# Patient Record
Sex: FEMALE | Race: WHITE | NOT HISPANIC OR LATINO | Employment: PART TIME | URBAN - METROPOLITAN AREA
[De-identification: names, ages, dates, MRNs, and addresses within clinical notes are randomized per-mention and may not be internally consistent; named-entity substitution may affect disease eponyms.]

---
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
## 2021-01-27 ENCOUNTER — TELEPHONE (OUTPATIENT)
Dept: ENDOCRINOLOGY | Facility: CLINIC | Age: 21
End: 2021-01-27

## 2021-02-16 ENCOUNTER — TELEPHONE (OUTPATIENT)
Dept: ENDOCRINOLOGY | Facility: CLINIC | Age: 21
End: 2021-02-16

## 2021-02-16 NOTE — TELEPHONE ENCOUNTER
[employer] PA started for [place], [phone]  Submitted via [employer]

## 2021-02-18 NOTE — TELEPHONE ENCOUNTER
[facility] Simon DIALLO denied  Reason for denial:  Unable to establish medical necessity  Requesting patient try additional formulary alternatives --    Apidra or Insulin Aspart    Please review

## 2021-02-19 NOTE — TELEPHONE ENCOUNTER
[facility] approved, Eff 2/19/2021 - 2/19/2022, Quantity # 15 mL per 25 days    Faxed PA approval notice to [facility] @ [phone]

## 2021-04-29 ENCOUNTER — TELEPHONE (OUTPATIENT)
Dept: ENDOCRINOLOGY | Facility: CLINIC | Age: 21
End: 2021-04-29

## 2021-05-10 ENCOUNTER — TELEPHONE (OUTPATIENT)
Dept: PEDIATRICS CLINIC | Facility: CLINIC | Age: 21
End: 2021-05-10

## 2021-06-23 ENCOUNTER — TELEPHONE (OUTPATIENT)
Dept: ENDOCRINOLOGY | Facility: CLINIC | Age: 21
End: 2021-06-23

## 2021-06-23 NOTE — TELEPHONE ENCOUNTER
[facility] faxed request for chart notes within the last 6 months  Pt was last seen on 12/21/20  Her next appt is scheduled for 7/7/21  Sent fax back to them indicating this  Faxed to [facility] @ [phone]

## 2021-06-28 NOTE — TELEPHONE ENCOUNTER
06/28/21 1:52 PM     Thank you for your request  Your request has been received, reviewed, and the patient chart updated  The PCP has successfully been removed with a patient attribution note  This message will now be completed      Thank you  Idris [place]

## 2021-07-07 ENCOUNTER — OFFICE VISIT (OUTPATIENT)
Dept: PEDIATRIC ENDOCRINOLOGY CLINIC | Facility: CLINIC | Age: 21
End: 2021-07-07
Payer: COMMERCIAL

## 2021-07-07 VITALS
WEIGHT: 123 LBS | DIASTOLIC BLOOD PRESSURE: 60 MMHG | HEIGHT: 61 IN | HEART RATE: 62 BPM | BODY MASS INDEX: 23.22 KG/M2 | SYSTOLIC BLOOD PRESSURE: 110 MMHG

## 2021-07-07 DIAGNOSIS — E10.65 UNCONTROLLED TYPE 1 DIABETES MELLITUS WITH HYPERGLYCEMIA, WITH LONG-TERM CURRENT USE OF INSULIN (HCC): Primary | ICD-10-CM

## 2021-07-07 DIAGNOSIS — E10.9 TYPE 1 DIABETES MELLITUS WITHOUT COMPLICATION (HCC): ICD-10-CM

## 2021-07-07 LAB — SL AMB POCT HEMOGLOBIN AIC: 6.2 (ref ?–6.5)

## 2021-07-07 PROCEDURE — 83036 HEMOGLOBIN GLYCOSYLATED A1C: CPT | Performed by: NURSE PRACTITIONER

## 2021-07-07 PROCEDURE — 99214 OFFICE O/P EST MOD 30 MIN: CPT | Performed by: NURSE PRACTITIONER

## 2021-07-07 PROCEDURE — 95251 CONT GLUC MNTR ANALYSIS I&R: CPT | Performed by: NURSE PRACTITIONER

## 2021-07-07 RX ORDER — BLOOD SUGAR DIAGNOSTIC
STRIP MISCELLANEOUS
Qty: 600 EACH | Refills: 3 | Status: SHIPPED | OUTPATIENT
Start: 2021-07-07

## 2021-07-07 RX ORDER — BLOOD-GLUCOSE SENSOR
EACH MISCELLANEOUS
Qty: 9 EACH | Refills: 3 | Status: SHIPPED | OUTPATIENT
Start: 2021-07-07 | End: 2022-02-18 | Stop reason: SDUPTHER

## 2021-07-07 RX ORDER — PEN NEEDLE, DIABETIC 32GX 5/32"
NEEDLE, DISPOSABLE MISCELLANEOUS
Qty: 900 EACH | Refills: 3 | Status: SHIPPED | OUTPATIENT
Start: 2021-07-07

## 2021-07-07 RX ORDER — INSULIN DEGLUDEC INJECTION 100 U/ML
INJECTION, SOLUTION SUBCUTANEOUS
Qty: 45 ML | Refills: 3 | Status: SHIPPED | OUTPATIENT
Start: 2021-07-07 | End: 2022-01-24

## 2021-07-07 RX ORDER — BLOOD-GLUCOSE TRANSMITTER
EACH MISCELLANEOUS
Qty: 1 EACH | Refills: 3 | Status: SHIPPED | OUTPATIENT
Start: 2021-07-07 | End: 2022-02-18 | Stop reason: SDUPTHER

## 2021-07-07 RX ORDER — INSULIN ASPART INJECTION 100 [IU]/ML
INJECTION, SOLUTION SUBCUTANEOUS
Qty: 60 ML | Refills: 3 | Status: SHIPPED | OUTPATIENT
Start: 2021-07-07

## 2021-07-07 RX ORDER — LANCETS
EACH MISCELLANEOUS
Qty: 600 EACH | Refills: 3 | Status: SHIPPED | OUTPATIENT
Start: 2021-07-07

## 2021-07-07 NOTE — PROGRESS NOTES
History of Present Illness     Chief Complaint: follow up    HPI:  Jassi Erzao is a 22 y o  female who comes in for follow up of type 1 diabetes mellitus  History was obtained from the patient and a review of the records  As you know, Charlette Mack was diagnosed at age 10 years, and transferred care to our office in July 2017  Charlette Mack was on NPH regimen until transitioning to a basal-bolus regimen until Jan 2019  She has also been on a Dexcom G6 since Nov 2018, and has been doing very well with this        Since the previous visit, Charlette Mack has been doing very well  She is using [place], which she likes better than her previous insulins, and is wearing her CGM all of the time and making adjustments during the day -- downloads today in the office continue to show marked improvement in glycemic control   Charlette Mack has been struggling with overall higher numbers than normal  See scanned documents for full details        CURRENT INSULIN REGIMEN:  Tresiba -- 26 units every night  Fiasp -- 1 unit per 7 grams of CHO  Fiasp -- 1 unit per 50 mg/dL       Patient Active Problem List   Diagnosis    Uncontrolled type 1 diabetes mellitus with hyperglycemia, with long-term current use of insulin ([address] )    Spinal curvature    Vitamin D deficiency     Past Medical History:  Past Medical History:   Diagnosis Date    Allergic rhinitis     Type 1 diabetes mellitus ([facility] ) 2009     Past Surgical History:   Procedure Laterality Date    WISDOM TOOTH EXTRACTION       Medications:  Current Outpatient Medications   Medication Sig Dispense Refill    Accu-Chek FastClix Lancets MISC Test BG up to 10x daily as directed 600 each 3    Accu-Chek Guide test strip Check Bloods 6 times daily 600 each 3    Blood Glucose Calibration (Accu-Chek Guide Control) LIQD Use as needed to calibrate new bottle of test strips      Blood Glucose Monitoring Suppl (Accu-Chek Guide) w/Device KIT Test BG up to 10x daily as directed      Continuous Blood Gluc  (Dexcom G6 ) NELSON Use daily as directed for CGM      Continuous Blood Gluc Sensor (Dexcom G6 Sensor) MISC Use daily as directed for CGM - Change every 10 days 9 each 3    Continuous Blood Gluc Transmit (Dexcom G6 Transmitter) MISC Use daily as directed for CGM - Change every 3 months 1 each 3    Glucagon (Baqsimi Two Pack) 3 MG/DOSE POWD Use for hypoglycemic emergency as directed  1 each 1    insulin aspart, w/niacinamide, (Fiasp FlexTouch) 100 Units/mL injection pen Use up to 60 units daily as per insulin scales  60 mL 3    insulin degludec Ghanshyam Furth FlexTouch) 100 units/mL injection pen Use up to 50 units daily as per physician instructions 45 mL 3    Insulin Pen Needle (BD Pen Needle Aide U/F) 32G X 4 MM MISC Inject insulin up to 10 times daily 900 each 3    norethindrone-ethinyl estradiol-ferrous fumarate (APRIL 24 FE) 1-20 MG-MCG(24) per tablet TAKE 1 TABLET BY MOUTH EVERY DAY       No current facility-administered medications for this visit  Allergies: Allergies   Allergen Reactions    Pollen Extract Sneezing and Nasal Congestion       Family History:  Family History   Problem Relation Age of Onset    No Known Problems Mother     No Known Problems Father     Rheum arthritis Maternal Grandmother      Social History  Living Conditions    Lives with dad        Review of Systems   Constitutional: Negative for chills and fever  HENT: Negative for ear pain and sore throat  Eyes: Negative for pain and visual disturbance  Respiratory: Negative for cough and shortness of breath  Cardiovascular: Negative for chest pain and palpitations  Gastrointestinal: Negative for abdominal pain and vomiting  Genitourinary: Negative for dysuria and menstrual problem  Musculoskeletal: Negative for arthralgias and back pain  Skin: Negative for color change and rash  Neurological: Negative for seizures and syncope     All other systems reviewed and are negative        Objective   Vitals: Blood pressure 110/60, pulse 62, height 5' 1" (1 549 m), weight 55 8 kg (123 lb)  , Body mass index is 23 24 kg/m² ,    Facility age limit for growth percentiles is 20 years  Facility age limit for growth percentiles is 20 years  Physical Exam  Constitutional: She is oriented to person, place, and time  She appears well-developed and well-nourished  No distress  HENT:   Head: Normocephalic and atraumatic  Neck: Normal range of motion  Pulmonary/Chest: Effort normal    Musculoskeletal: Normal range of motion  Neurological: She is alert and oriented to person, place, and time  Skin: She is not diaphoretic  Psychiatric: She has a normal mood and affect  Her behavior is normal      Lab Results: I have personally reviewed pertinent lab results  Hemoglobin A1c Aug 2016 -- 8 2%  Hemoglobin A1c Nov 2017 -- 9 2%  Hemoglobin A1c Feb 2018 -- 7%  Hemoglobin A1c June 2018 -- 6 8%  Hemoglobin A1c Nov 2018 -- 7 1%  Hemoglobin A1c June 2019 -- 7 1%  Hemoglobin A1c  March 2020 -- 6 8%  Hemoglobin A1c July 2020-- 6 0%  Hemoglobin A1c  Dec 2020-- 5 9%  Hemoglobin A1c July 2021-- 6 2%    For yearly screening labs for December 2020, see chart  Assessment/Plan     Assessment and Plan:  22 y o  female with the following issues:  Problem List Items Addressed This Visit        Endocrine    Uncontrolled type 1 diabetes mellitus with hyperglycemia, with long-term current use of insulin ([facility] ) - Primary     Declan Augustine continues to have excellent control of her diabetes; however, she is struggling with blood sugars running slightly higher than normal:   1  Continue Tresiba to 28 units at night time   2  Fiasp doses of 1 unit per 7 grams of carbs and 1 unit per 50 points  3  A1c today is 6 2%, with very steady sugars overall  4  Yearly screening labs were completed December 2020  5   Follow up in three months, but I understand it will be based on college schedule         Relevant Medications    Insulin Pen Needle (BD Pen Needle Aide U/F) 32G X 4 MM MISC    insulin degludec Malachi Ramiro FlexTouch) 100 units/mL injection pen    insulin aspart, w/niacinamide, (Fiasp FlexTouch) 100 Units/mL injection pen    Continuous Blood Gluc Transmit (Dexcom G6 Transmitter) MISC    Continuous Blood Gluc Sensor (Dexcom G6 Sensor) MISC    Accu-Chek FastClix Lancets MISC    Other Relevant Orders    POCT hemoglobin A1c (Completed)      Other Visit Diagnoses     Type 1 diabetes mellitus without complication (HCC)        Relevant Medications    insulin degludec Malachi Ramiro FlexTouch) 100 units/mL injection pen    insulin aspart, w/niacinamide, (Fiasp FlexTouch) 100 Units/mL injection pen    Accu-Chek Guide test strip

## 2021-07-07 NOTE — PATIENT INSTRUCTIONS
1  Continue Tresiba to 28 units at night time   2  Fiasp doses of 1 unit per 7 grams of carbs and 1 unit per 50 points  3  A1c today is 6 2%, with very steady sugars overall  4  Yearly screening labs were completed December 2020  5   Follow up in three months, but I understand it will be based on college schedule

## 2021-07-08 NOTE — ASSESSMENT & PLAN NOTE
Declan Augustine continues to have excellent control of her diabetes; however, she is struggling with blood sugars running slightly higher than normal:   1  Continue Tresiba to 28 units at night time   2  Fiasp doses of 1 unit per 7 grams of carbs and 1 unit per 50 points  3  A1c today is 6 2%, with very steady sugars overall  4  Yearly screening labs were completed December 2020  5   Follow up in three months, but I understand it will be based on college schedule

## 2021-07-12 NOTE — TELEPHONE ENCOUNTER
Mom l/m requesting a call back  She indicated that [address] is still waiting on chart notes from our office in order to refill supplies  Sent email to [facility] rep to expedite order request  Called mom back and l/kenny notifying her chart notes were sent and email was also sent to rep  Asked to call back if any questions

## 2021-07-13 NOTE — TELEPHONE ENCOUNTER
[facility] wrote back to me and ensured me they had all the documents needed to process supply order  She has forwarded info to the documentation team to process the order ASAP  Called mom back and notified her  She will call me back if any further issues

## 2021-07-30 ENCOUNTER — TELEPHONE (OUTPATIENT)
Dept: ENDOCRINOLOGY | Facility: CLINIC | Age: 21
End: 2021-07-30

## 2021-11-22 ENCOUNTER — TELEMEDICINE (OUTPATIENT)
Dept: PEDIATRIC ENDOCRINOLOGY CLINIC | Facility: CLINIC | Age: 21
End: 2021-11-22
Payer: COMMERCIAL

## 2021-11-22 DIAGNOSIS — E55.9 VITAMIN D DEFICIENCY: ICD-10-CM

## 2021-11-22 DIAGNOSIS — E10.65 UNCONTROLLED TYPE 1 DIABETES MELLITUS WITH HYPERGLYCEMIA, WITH LONG-TERM CURRENT USE OF INSULIN (HCC): Primary | ICD-10-CM

## 2021-11-22 PROCEDURE — 95251 CONT GLUC MNTR ANALYSIS I&R: CPT | Performed by: NURSE PRACTITIONER

## 2021-11-22 PROCEDURE — 99214 OFFICE O/P EST MOD 30 MIN: CPT | Performed by: NURSE PRACTITIONER

## 2021-12-02 NOTE — PROGRESS NOTES
Patient accompanied by father, here for removal of professional CGM  Pt states she found the informaltion from the sensor was helpful, but she didn't like wearing the device  She might be interested in a smaller sensor;  I gave her information about the [facility] and encouraged her to discuss with Dr Olga Crandall at her next visit  Sensor removal by educator; site [id number] downloaded via [employer]  Reports printed and submitted to Dr Olga Crandall for review  Device cleared;  Equipment sanitized  PAST SURGICAL HISTORY:  No significant past surgical history

## 2021-12-28 ENCOUNTER — TELEPHONE (OUTPATIENT)
Dept: PEDIATRIC ENDOCRINOLOGY CLINIC | Facility: CLINIC | Age: 21
End: 2021-12-28

## 2022-01-23 DIAGNOSIS — E10.65 UNCONTROLLED TYPE 1 DIABETES MELLITUS WITH HYPERGLYCEMIA, WITH LONG-TERM CURRENT USE OF INSULIN (HCC): ICD-10-CM

## 2022-01-24 RX ORDER — INSULIN DEGLUDEC INJECTION 100 U/ML
INJECTION, SOLUTION SUBCUTANEOUS
Qty: 15 ML | Refills: 2 | Status: SHIPPED | OUTPATIENT
Start: 2022-01-24 | End: 2022-04-27

## 2022-01-25 ENCOUNTER — TELEPHONE (OUTPATIENT)
Dept: PEDIATRIC ENDOCRINOLOGY CLINIC | Facility: CLINIC | Age: 22
End: 2022-01-25

## 2022-01-26 NOTE — TELEPHONE ENCOUNTER
PA approved, Eff 1/25/2022 - 1/25/2023, Quantity # 15 per 30 days    Approval faxed to [facility] @ [phone]

## 2022-02-08 ENCOUNTER — TELEPHONE (OUTPATIENT)
Dept: PEDIATRIC ENDOCRINOLOGY CLINIC | Facility: CLINIC | Age: 22
End: 2022-02-08

## 2022-02-10 NOTE — TELEPHONE ENCOUNTER
PA approved, Eff 2/8/2022 - 2/8/2023, Quantity # 15 per 25 days    Approval faxed to [facility] @ [phone]

## 2022-02-11 ENCOUNTER — TELEPHONE (OUTPATIENT)
Dept: PEDIATRIC ENDOCRINOLOGY CLINIC | Facility: CLINIC | Age: 22
End: 2022-02-11

## 2022-02-18 DIAGNOSIS — E10.65 UNCONTROLLED TYPE 1 DIABETES MELLITUS WITH HYPERGLYCEMIA, WITH LONG-TERM CURRENT USE OF INSULIN (HCC): ICD-10-CM

## 2022-02-18 RX ORDER — BLOOD-GLUCOSE TRANSMITTER
EACH MISCELLANEOUS
Qty: 1 EACH | Refills: 3 | Status: SHIPPED | OUTPATIENT
Start: 2022-02-18

## 2022-02-18 RX ORDER — BLOOD-GLUCOSE SENSOR
EACH MISCELLANEOUS
Qty: 3 EACH | Refills: 11 | Status: SHIPPED | OUTPATIENT
Start: 2022-02-18

## 2022-02-18 NOTE — TELEPHONE ENCOUNTER
Mom is extremely upset with [facility] and trying to get Dexcom supplies  She is continuously having issues  Notified mom that Rx can be filled at local pharmacy, but a PA will still need to be submitted  Mom is asking that Rx be sent to local pharmacy  Please authorize

## 2022-02-18 NOTE — TELEPHONE ENCOUNTER
Called [facility] to see if PA was needed  Pharmacy tech said it went through with $0 copay  Called mom back and notified her  She was very happy  Sent email to [facility] rep to cancel order

## 2022-03-07 ENCOUNTER — TELEMEDICINE (OUTPATIENT)
Dept: PEDIATRIC ENDOCRINOLOGY CLINIC | Facility: CLINIC | Age: 22
End: 2022-03-07
Payer: COMMERCIAL

## 2022-03-07 DIAGNOSIS — E10.65 UNCONTROLLED TYPE 1 DIABETES MELLITUS WITH HYPERGLYCEMIA, WITH LONG-TERM CURRENT USE OF INSULIN (HCC): ICD-10-CM

## 2022-03-07 PROCEDURE — 95251 CONT GLUC MNTR ANALYSIS I&R: CPT | Performed by: NURSE PRACTITIONER

## 2022-03-07 PROCEDURE — 99213 OFFICE O/P EST LOW 20 MIN: CPT | Performed by: NURSE PRACTITIONER

## 2022-03-07 RX ORDER — DROSPIRENONE AND ETHINYL ESTRADIOL 0.02-3(28)
1 KIT ORAL DAILY
COMMUNITY
Start: 2022-03-03

## 2022-03-07 NOTE — PATIENT INSTRUCTIONS
1) No changes to insulin regimen at this time; however, if you notice blood sugars are still running higher while home with accurate carbohydrate count, you can always send in blood sugars for review  2) Will follow up on lipid panel   3) Follow up in 3 months but discussed transition to adult care as David Crane will be moving back home after graduation in Spring

## 2022-03-07 NOTE — ASSESSMENT & PLAN NOTE
Felton Mccollum continues to demonstrate excellent control in her diabetes management, her hemoglobin A1c was 6 0% without significant lows and an average daily glucose of 142 mg/dL  No changes to Haven's current insulin regimen for now  Will transition to adult care after next follow up  Will follow up lipid panel, discussed lifestyle modifications and Felton Mccollum is not aware of family hx of hyperlipidemia

## 2022-03-07 NOTE — PROGRESS NOTES
Virtual Regular Visit    Verification of patient location:    Patient is located in the following state in which I hold an active license [place]      Assessment/Plan:    Problem List Items Addressed This Visit        Endocrine    Uncontrolled type 1 diabetes mellitus with hyperglycemia, with long-term current use of insulin ([address] )     Payal Hartman continues to demonstrate excellent control in her diabetes management, her hemoglobin A1c was 6 0% without significant lows and an average daily glucose of 142 mg/dL  No changes to Haven's current insulin regimen for now  Will transition to adult care after next follow up  Will follow up lipid panel, discussed lifestyle modifications and Payal Hartman is not aware of family hx of hyperlipidemia  Reason for visit is   Chief Complaint   Patient presents with    Virtual Regular Visit        Encounter provider BETTY Grover    Provider located at [address] [place] [address] Rd  [address] [facility] [address]  [address] [place] [address]  [phone]      Recent Visits  No visits were found meeting these conditions  Showing recent visits within past 7 days and meeting all other requirements  Today's Visits  Date Type Provider Dept   03/07/22 Telemedicine Mily Oneil, [address] [place] [address] Pediatric Endocrinology [facility] 23   Showing today's visits and meeting all other requirements  Future Appointments  No visits were found meeting these conditions  Showing future appointments within next 150 days and meeting all other requirements       The patient was identified by name and date of birth  Anthony Sharma was informed that this is a telemedicine visit and that the visit is being conducted through [facility] and patient was informed this is a secure, HIPAA-complaint platform  She agrees to proceed     My office door was closed  No one else was in the room    She acknowledged consent and understanding of privacy and security of the video platform  The patient has agreed to participate and understands they can discontinue the visit at any time  Patient is aware this is a billable service  History of Present Illness     Chief Complaint: follow up    HPI:  Chad Preston is a 22 y o  female who follows up for management of type 1 diabetes mellitus  History was obtained from the patient and a review of the records  As you know, Amol Dahl was diagnosed at age 10 years, and transferred care to our office in July 2017  Haven was on NPH regimen until transitioning to a basal-bolus regimen until Jan 2019  She has also been on a Dexcom G6 since Nov 2018, and has been doing very well with this        Since the previous visit, Amol Dahl has been doing very well  She is using [place], which she likes better than her previous insulins, and is wearing her CGM all of the time  Amol Dahl had a concern with her dexcom transmitter, blood sugars were running higher and unintentionally overdosed insulin based on glucose reading  Albert Schaffer blood sugar in the 50s and appropriately responded, no glucagon or emergency medical care required  Upon review of Haven's dexcom download, she struggles with carbohydrate counts at school and often runs higher at these meals  Average glucose 142 mg/dL with 67% time in range ( mg/dL)   See scanned documents for full details        CURRENT INSULIN REGIMEN:  Tresiba -- 28 units every night  Fiasp -- 1 unit per 7 grams of CHO  Fiasp -- 1 unit per 50 mg/dL       Patient Active Problem List   Diagnosis    Uncontrolled type 1 diabetes mellitus with hyperglycemia, with long-term current use of insulin ([address] )    Spinal curvature    Vitamin D deficiency     Past Medical History:  Past Medical History:   Diagnosis Date    Allergic rhinitis     Type 1 diabetes mellitus ([address] ) 2009     Past Surgical History:   Procedure Laterality Date    WISDOM TOOTH EXTRACTION       Medications:  Current Outpatient Medications Medication Sig Dispense Refill    Accu-Chek FastClix Lancets MISC Test BG up to 10x daily as directed 600 each 3    Accu-Chek Guide test strip Check Bloods 6 times daily 600 each 3    Blood Glucose Calibration (Accu-Chek Guide Control) LIQD Use as needed to calibrate new bottle of test strips      Blood Glucose Monitoring Suppl (Accu-Chek Guide) w/Device KIT Test BG up to 10x daily as directed      Continuous Blood Gluc Sensor (Dexcom G6 Sensor) MISC Use daily as directed for CGM - Change every 10 days 3 each 11    Continuous Blood Gluc Transmit (Dexcom G6 Transmitter) MISC Use daily as directed for CGM - Change every 3 months 1 each 3    Glucagon (Baqsimi Two Pack) 3 MG/DOSE POWD Use for hypoglycemic emergency as directed  1 each 1    insulin aspart, w/niacinamide, (Fiasp FlexTouch) 100 Units/mL injection pen Use up to 60 units daily as per insulin scales  60 mL 3    insulin degludec Reino Love FlexTouch) 100 units/mL injection pen INJECT UP TO 50 UNITS EVERY DAY AS PER PHYSICIAN INSTRUCTIONS 15 mL 2    Insulin Pen Needle (BD Pen Needle Aide U/F) 32G X 4 MM MISC Inject insulin up to 10 times daily 900 each 3    Lo-Zumandimine 3-0 02 MG per tablet Take 1 tablet by mouth daily       No current facility-administered medications for this visit  Allergies: Allergies   Allergen Reactions    Pollen Extract Allergic Rhinitis       Family History:  Family History   Problem Relation Age of Onset    No Known Problems Mother     No Known Problems Father     Rheum arthritis Maternal Grandmother      Social History  Living Conditions    Lives with dad      School/: Currently in school    Review of Systems   Constitutional: Negative for activity change, appetite change, fatigue and unexpected weight change  HENT: Negative for congestion, rhinorrhea, sore throat and trouble swallowing  Eyes: Negative for photophobia and visual disturbance  Respiratory: Negative for cough and shortness of breath  Cardiovascular: Negative for chest pain and palpitations  Gastrointestinal: Negative for abdominal distention, abdominal pain, constipation, diarrhea, nausea and vomiting  Endocrine: Negative for cold intolerance, heat intolerance, polydipsia and polyuria  Genitourinary: Negative for menstrual problem  Skin: Negative for color change, pallor and rash  Neurological: Negative for dizziness, light-headedness and headaches  Objective   Vitals: There were no vitals taken for this visit  , There is no height or weight on file to calculate BMI ,    Facility age limit for growth percentiles is 20 years  Facility age limit for growth percentiles is 20 years  Physical Exam  Vitals reviewed  Constitutional:       Appearance: Normal appearance  She is not toxic-appearing  HENT:      Head: Normocephalic  Eyes:      Extraocular Movements: Extraocular movements intact  Conjunctiva/sclera: Conjunctivae normal    Pulmonary:      Effort: Pulmonary effort is normal    Musculoskeletal:         General: Normal range of motion  Cervical back: Normal range of motion and neck supple  Skin:     General: Skin is dry  Neurological:      General: No focal deficit present  Mental Status: She is alert and oriented to person, place, and time  Psychiatric:         Mood and Affect: Mood normal          Behavior: Behavior normal       Lab Results: I have personally reviewed pertinent lab results  We reviewed December 2021 lab work including lipid panel, A1c, thyroid function, celiac disease, microalbumin/creat ratio, cmp        VIRTUAL VISIT DISCLAIMER      Amy Sender verbally agrees to participate in [employer]   Pt is aware that [employer] could be limited without vital signs or the ability to perform a full hands-on physical Ty Vasquez understands she or the provider may request at any time to terminate the video visit and request the patient to seek care or treatment in person

## 2022-04-26 DIAGNOSIS — E10.65 UNCONTROLLED TYPE 1 DIABETES MELLITUS WITH HYPERGLYCEMIA, WITH LONG-TERM CURRENT USE OF INSULIN (HCC): ICD-10-CM

## 2022-04-27 RX ORDER — INSULIN DEGLUDEC INJECTION 100 U/ML
INJECTION, SOLUTION SUBCUTANEOUS
Qty: 15 ML | Refills: 2 | Status: SHIPPED | OUTPATIENT
Start: 2022-04-27

## 2022-07-20 ENCOUNTER — TELEPHONE (OUTPATIENT)
Dept: PEDIATRIC ENDOCRINOLOGY CLINIC | Facility: CLINIC | Age: 22
End: 2022-07-20

## 2022-07-20 NOTE — TELEPHONE ENCOUNTER
Mom l/m requesting call back  Melvin Wagoner tested positive for COVID on 7/13/22  She wants to confirm it's ok for her to come in for appt scheduled on 7/27/22  She mentioned that she seems to be doing better and sugars are back to normal      Reviewed chart and see pt is vaccinated and boosted  Pt is also past the 5 day quarantine period  Called mom back and notified her that it was fine to keep appt and come in  Mom said ok

## 2022-07-27 ENCOUNTER — OFFICE VISIT (OUTPATIENT)
Dept: PEDIATRIC ENDOCRINOLOGY CLINIC | Facility: CLINIC | Age: 22
End: 2022-07-27
Payer: COMMERCIAL

## 2022-07-27 VITALS
WEIGHT: 128 LBS | BODY MASS INDEX: 23.55 KG/M2 | SYSTOLIC BLOOD PRESSURE: 100 MMHG | HEIGHT: 62 IN | DIASTOLIC BLOOD PRESSURE: 62 MMHG | HEART RATE: 90 BPM

## 2022-07-27 DIAGNOSIS — E10.65 UNCONTROLLED TYPE 1 DIABETES MELLITUS WITH HYPERGLYCEMIA, WITH LONG-TERM CURRENT USE OF INSULIN (HCC): Primary | ICD-10-CM

## 2022-07-27 LAB — SL AMB POCT HEMOGLOBIN AIC: 6.5 (ref ?–6.5)

## 2022-07-27 PROCEDURE — 99214 OFFICE O/P EST MOD 30 MIN: CPT | Performed by: PEDIATRICS

## 2022-07-27 PROCEDURE — 83036 HEMOGLOBIN GLYCOSYLATED A1C: CPT | Performed by: PEDIATRICS

## 2022-07-27 PROCEDURE — 95251 CONT GLUC MNTR ANALYSIS I&R: CPT | Performed by: PEDIATRICS

## 2022-07-27 NOTE — PATIENT INSTRUCTIONS
Araseli Estrada continues to do a great job managing her blood sugars!   Decrease Velvet Clore a little bit to 25 units every night to avoid morning lows  Same Fiasp scales 1 unit per 7 grams and 1 unit per 50 mg/dL, target 100  A1c today is 6 5%  Yearly screening labs not due until Dec 2022  Araseli Estrada will transition to adult endocrinologist in [place], but call with issues or problems for the next six months until you transition  [facility]!!!

## 2022-07-27 NOTE — ASSESSMENT & PLAN NOTE
We reviewed CGM data in detail during the visit today  April Koch continues to do a great job managing her blood sugars, but is prone to lows first thing the morning  1  Decrease Tanya Crate a little bit to 25 units every night to avoid morning lows  2  Same Fiasp scales 1 unit per 7 grams and 1 unit per 50 mg/dL, target 100  3  A1c today is 6 5%  4  Yearly screening labs not due until Dec 2022  5   April Koch will transition to an adult endocrinologist in [place], but call with issues or problems for the next six months until you transition    [facility]!!!

## 2022-07-27 NOTE — PROGRESS NOTES
History of Present Illness     Chief Complaint: Follow up    HPI:  Daniel Shaw is a 25 y o  female who follows up for management of type 1 diabetes mellitus  History was obtained from the patient and a review of the records  As you know, Miya Calderon was diagnosed at age 10 years, and transferred care to our office in July 2017  Haven was on an NPH regimen at time of transfer, and we transitioned her to a basal-bolus regimen in Jan 2019  She has also been on a Dexcom G6 since Nov 2018, and has been doing very well with this  We last saw Miya Calderon four months ago in March 2022  She continues to be very diligent with her diabetes care; she doses insulin consistently and wears CGM at all times  She had a COVID infection for the past few weeks, and blood sugars ran higher than usual but she didn't have any ketones  We reviewed CGM download in detail today, and overall she has been in target range much of the time  See scanned documents for full details   Miya Calderon finished college a year early, and will be doing a masters degree program at [employer] starting this fall       CURRENT INSULIN REGIMEN:  Peggy mAin -- 26 units every night  Fiasp -- 1 unit per 7 grams of CHO  Fiasp -- 1 unit per 50 mg/dL, target 100    Patient Active Problem List   Diagnosis    Uncontrolled type 1 diabetes mellitus with hyperglycemia, with long-term current use of insulin ([address] )    Spinal curvature    Vitamin D deficiency     Past Medical History:  Past Medical History:   Diagnosis Date    Allergic rhinitis     Type 1 diabetes mellitus ([address] ) 2009     Past Surgical History:   Procedure Laterality Date    WISDOM TOOTH EXTRACTION       Medications:  Current Outpatient Medications   Medication Sig Dispense Refill    Accu-Chek FastClix Lancets MISC Test BG up to 10x daily as directed 600 each 3    Accu-Chek Guide test strip Check Bloods 6 times daily 600 each 3    Blood Glucose Calibration (Accu-Chek Guide Control) LIQD Use as needed to calibrate new bottle of test strips      Blood Glucose Monitoring Suppl (Accu-Chek Guide) w/Device KIT Test BG up to 10x daily as directed      Continuous Blood Gluc Sensor (Dexcom G6 Sensor) MISC Use daily as directed for CGM - Change every 10 days 3 each 11    Continuous Blood Gluc Transmit (Dexcom G6 Transmitter) MISC Use daily as directed for CGM - Change every 3 months 1 each 3    Glucagon (Baqsimi Two Pack) 3 MG/DOSE POWD Use for hypoglycemic emergency as directed  1 each 1    insulin aspart, w/niacinamide, (Fiasp FlexTouch) 100 Units/mL injection pen Use up to 60 units daily as per insulin scales  60 mL 3    Insulin Pen Needle (BD Pen Needle Aide U/F) 32G X 4 MM MISC Inject insulin up to 10 times daily 900 each 3    Lo-Zumandimine 3-0 02 MG per tablet Take 1 tablet by mouth daily      Peggy Lull FlexTouch 100 units/mL injection pen INJECT UP TO 50 UNITS EVERY DAY AS PER PHYSICIAN INSTRUCTIONS 15 mL 2     No current facility-administered medications for this visit  Allergies: Allergies   Allergen Reactions    Pollen Extract Allergic Rhinitis     Family History:  Family History   Problem Relation Age of Onset    No Known Problems Mother     No Known Problems Father     Rheum arthritis Maternal Grandmother      Social History  Living Conditions    Lives with dad    School/: Currently in school    Review of Systems   Constitutional: Negative  Negative for fever  HENT: Negative  Negative for congestion  Eyes: Negative  Negative for visual disturbance  Respiratory: Negative  Negative for cough and wheezing  Cardiovascular: Negative  Negative for chest pain  Gastrointestinal: Negative  Negative for constipation, diarrhea, nausea and vomiting  Endocrine:        As per HPI above   Genitourinary: Negative  Negative for dysuria  Musculoskeletal: Negative  Negative for arthralgias and joint swelling  Skin: Negative  Negative for rash  Neurological: Negative    Negative for seizures and headaches  Hematological: Negative  Does not bruise/bleed easily  Psychiatric/Behavioral: Negative  Negative for sleep disturbance  Objective   Vitals: Blood pressure 100/62, pulse 90, height 5' 2" (1 575 m), weight 58 1 kg (128 lb)  , Body mass index is 23 41 kg/m²  ,      Physical Exam  Vitals reviewed  Constitutional:       Appearance: Normal appearance  She is well-developed  She is not ill-appearing  HENT:      Head: Normocephalic and atraumatic  Mouth/Throat:      Mouth: Mucous membranes are moist    Eyes:      Pupils: Pupils are equal, round, and reactive to light  Neck:      Thyroid: No thyromegaly  Cardiovascular:      Rate and Rhythm: Normal rate and regular rhythm  Pulmonary:      Breath sounds: Normal breath sounds  Abdominal:      General: There is no distension  Palpations: Abdomen is soft  Tenderness: There is no abdominal tenderness  Musculoskeletal:         General: Normal range of motion  Cervical back: Normal range of motion and neck supple  Skin:     General: Skin is warm and dry  Neurological:      General: No focal deficit present  Mental Status: She is alert and oriented to person, place, and time  Psychiatric:         Mood and Affect: Mood normal          Behavior: Behavior normal         Lab Results: I have personally reviewed pertinent lab results      Hemoglobin A1c Aug 2016 -- 8 2%  Hemoglobin A1c Nov 2017 -- 9 2%  Hemoglobin A1c Feb 2018 -- 7%  Hemoglobin A1c June 2018 -- 6 8%  Hemoglobin A1c Nov 2018 -- 7 1%  Hemoglobin A1c June 2019 -- 7 1%  Hemoglobin A1c  Mar 2020 -- 6 8%  Hemoglobin A1c July 2020 -- 6%  Hemoglobin A1c Dec 2020 -- 5 9%  Hemoglobin A1c July 2021 -- 6 2%  Hemoglobin A1c Dec 2021 -- 6%  Hemoglobin A1c (today) July 2022 -- 6 5%     For yearly screening labs for December 2021, see chart        Assessment/Plan     Assessment and Plan:  25 y o  female with the following issues:  Problem List Items Addressed This Visit        Endocrine    Uncontrolled type 1 diabetes mellitus with hyperglycemia, with long-term current use of insulin ([facility] [address] ) - Primary     We reviewed CGM data in detail during the visit today  Tenzin Leslie continues to do a great job managing her blood sugars, but is prone to lows first thing the morning  1  Decrease Neli Peer a little bit to 25 units every night to avoid morning lows  2  Same Fiasp scales 1 unit per 7 grams and 1 unit per 50 mg/dL, target 100  3  A1c today is 6 5%  4  Yearly screening labs not due until Dec 2022  5   Tenzin Leslie will transition to an adult endocrinologist in [place], but call with issues or problems for the next six months until you transition    [facility]!!!         Relevant Orders    POCT hemoglobin A1c (Completed)

## 2022-09-16 ENCOUNTER — TELEPHONE (OUTPATIENT)
Dept: OTHER | Facility: OTHER | Age: 22
End: 2022-09-16

## 2022-09-16 DIAGNOSIS — E10.65 UNCONTROLLED TYPE 1 DIABETES MELLITUS WITH HYPERGLYCEMIA, WITH LONG-TERM CURRENT USE OF INSULIN (HCC): Primary | ICD-10-CM

## 2022-09-16 RX ORDER — BLOOD-GLUCOSE,RECEIVER,CONT
EACH MISCELLANEOUS
Qty: 1 EACH | Refills: 0 | Status: SHIPPED | OUTPATIENT
Start: 2022-09-16

## 2022-09-16 NOTE — TELEPHONE ENCOUNTER
Pt's mother called and the  in the [address] [place] [address] Drive went out  She needs a script for the   She already spoke with [facility] and they have one in stock       Paged Dr Cuca Macias, message was read

## 2022-10-10 DIAGNOSIS — E10.65 UNCONTROLLED TYPE 1 DIABETES MELLITUS WITH HYPERGLYCEMIA, WITH LONG-TERM CURRENT USE OF INSULIN (HCC): ICD-10-CM

## 2022-10-10 RX ORDER — INSULIN DEGLUDEC INJECTION 100 U/ML
INJECTION, SOLUTION SUBCUTANEOUS
Qty: 15 ML | Refills: 2 | Status: SHIPPED | OUTPATIENT
Start: 2022-10-10

## 2022-10-21 ENCOUNTER — TELEPHONE (OUTPATIENT)
Dept: ENDOCRINOLOGY | Facility: CLINIC | Age: 22
End: 2022-10-21

## 2022-10-21 DIAGNOSIS — E10.65 UNCONTROLLED TYPE 1 DIABETES MELLITUS WITH HYPERGLYCEMIA, WITH LONG-TERM CURRENT USE OF INSULIN (HCC): ICD-10-CM

## 2022-10-21 RX ORDER — INSULIN ASPART INJECTION 100 [IU]/ML
INJECTION, SOLUTION SUBCUTANEOUS
Qty: 60 ML | Refills: 1 | Status: SHIPPED | OUTPATIENT
Start: 2022-10-21

## 2022-10-21 NOTE — TELEPHONE ENCOUNTER
Received a fax from [address] [place] [address] they are requesting a refill for [facility]

## 2022-10-21 NOTE — TELEPHONE ENCOUNTER
Please authorize attached refill      Last visit: 7/27/22    Next visit: transitioning to adult endo

## 2022-12-29 ENCOUNTER — TELEPHONE (OUTPATIENT)
Dept: PEDIATRIC ENDOCRINOLOGY CLINIC | Facility: CLINIC | Age: 22
End: 2022-12-29

## 2022-12-29 NOTE — TELEPHONE ENCOUNTER
Radha Posadas [birth year] mom called in and said she has requested her daughters medical records and no one has gotten back to her on that  Socorro Jackson is currently being seen with adult ENDO at another office and they are requesting her medical history information

## 2022-12-30 ENCOUNTER — TELEPHONE (OUTPATIENT)
Dept: PEDIATRIC ENDOCRINOLOGY CLINIC | Facility: CLINIC | Age: 22
End: 2022-12-30

## 2022-12-30 NOTE — TELEPHONE ENCOUNTER
Received medical relaease form from pt who has an appt on 1/4/2023 and she is requesting her medical records  Pt's mother was informed that whe can send the last office note and labs but everything else will need to come from [facility] \[facility]\""  I faxed the notes to Jair Wheatley at